# Patient Record
Sex: FEMALE | Race: BLACK OR AFRICAN AMERICAN | Employment: FULL TIME | URBAN - METROPOLITAN AREA
[De-identification: names, ages, dates, MRNs, and addresses within clinical notes are randomized per-mention and may not be internally consistent; named-entity substitution may affect disease eponyms.]

---

## 2017-01-13 ENCOUNTER — TELEPHONE (OUTPATIENT)
Dept: OBGYN CLINIC | Age: 32
End: 2017-01-13

## 2017-01-25 ENCOUNTER — OFFICE VISIT (OUTPATIENT)
Dept: OBGYN CLINIC | Age: 32
End: 2017-01-25

## 2017-01-25 VITALS
SYSTOLIC BLOOD PRESSURE: 111 MMHG | WEIGHT: 137 LBS | DIASTOLIC BLOOD PRESSURE: 72 MMHG | BODY MASS INDEX: 25.21 KG/M2 | HEIGHT: 62 IN | HEART RATE: 102 BPM

## 2017-01-25 DIAGNOSIS — Z01.419 WELL WOMAN EXAM WITH ROUTINE GYNECOLOGICAL EXAM: Primary | ICD-10-CM

## 2017-01-25 DIAGNOSIS — Z30.09 FAMILY PLANNING: ICD-10-CM

## 2017-01-25 RX ORDER — NORGESTIMATE AND ETHINYL ESTRADIOL 0.25-0.035
1 KIT ORAL DAILY
Qty: 3 PACKAGE | Refills: 4 | Status: SHIPPED | OUTPATIENT
Start: 2017-01-25

## 2017-01-25 NOTE — PROGRESS NOTES
Subjective:   27 y.o. female for annual routine Pap and checkup. No LMP recorded. Last pap smear:    Menstrual history: regular  Dysmenorrhea no  H/o STIs:  no  Social History: single partner, contraception - Ortho-Evra patches weekly. [unfilled]  OB History    Para Term  AB SAB TAB Ectopic Multiple Living   4 2   2     2      # Outcome Date GA Lbr Graham/2nd Weight Sex Delivery Anes PTL Lv   4 Para 06 40w0d  6 lb 7 oz (2.92 kg) F       3 Para 04 42w0d  6 lb 12 oz (3.062 kg) M       2 AB            1 AB                   Pertinent past medical hstory: no history of HTN, DVT, CAD, DM, liver disease, migraines or smoking. Patient Active Problem List   Diagnosis Code    Trichomonas A59.9    Left breast mass N63    Fibroadenoma D24.9     Patient Active Problem List    Diagnosis Date Noted    Fibroadenoma 2015    Left breast mass 2015    Trichomonas 2014     Current Outpatient Prescriptions   Medication Sig Dispense Refill    norgestimate-ethinyl estradiol (ORTHO-CYCLEN, SPRINTEC) 0.25-35 mg-mcg tab Take 1 Tab by mouth daily. Indications: PREGNANCY CONTRACEPTION 3 Package 4     Allergies   Allergen Reactions    Morphine Itching     Past Medical History   Diagnosis Date    Fibroadenoma 2015    Trichomonas 2014     Past Surgical History   Procedure Laterality Date    Hx cholecystectomy      Hx  section       History reviewed. No pertinent family history. Social History   Substance Use Topics    Smoking status: Never Smoker    Smokeless tobacco: Not on file    Alcohol use Yes      Comment: Social Drinker        ROS:  Feeling well. No dyspnea or chest pain on exertion. No abdominal pain, change in bowel habits, black or bloody stools. No urinary tract symptoms. GYN ROS: normal menses, no pelvic pain or discharge, no breast pain or new or enlarging lumps on self exam. No neurological complaints.     Objective:     Visit Vitals    /72    Pulse (!) 102    Ht 5' 2\" (1.575 m)    Wt 137 lb (62.1 kg)    BMI 25.06 kg/m2     The patient appears well, alert, oriented x 3, in no distress. ENT normal.  Neck supple. No adenopathy or thyromegaly. KYRIE. Lungs are clear, good air entry, no wheezes, rhonchi or rales. S1 and S2 normal, no murmurs, regular rate and rhythm. Abdomen soft without tenderness, guarding, mass or organomegaly. Extremities show no edema, normal peripheral pulses. Neurological is normal, no focal findings. BREAST EXAM: right breast normal without mass, skin or nipple changes or axillary nodes, left breast with 2 cm mobile mass, known fibroadenoma, no skin or nipple changes or axillary nodes    PELVIC EXAM: VULVA: normal appearing vulva with no masses, tenderness or lesions, VAGINA: normal appearing vagina with normal color and discharge, no lesions, CERVIX: normal appearing cervix with normal discharge or lesions, UTERUS: uterus is normal size, shape, consistency and nontender, ADNEXA: normal adnexa in size, nontender and no masses, PAP: Pap smear done today, DNA probe for chlamydia and GC obtained    Assessment/Plan:   well woman  pap smear  counseled on breast self exam, use and side effects of OCP's and family planning choices  return annually or prn    ICD-10-CM ICD-9-CM    1. Well woman exam with routine gynecological exam Z01.419 V72.31 PAP IG, HPV AND RFX HPV 53/72,69([id number])   2. Family planning Z30.09 V25.09 AMB POC URINE PREGNANCY TEST, VISUAL COLOR COMPARISON      norgestimate-ethinyl estradiol (ORTHO-CYCLEN, SPRINTEC) 0.25-35 mg-mcg tab   . I have verbalized the plan of care with patient and the patient expressed understanding.    All questions were answered

## 2017-01-25 NOTE — PATIENT INSTRUCTIONS
Combination Birth Control Pills: Care Instructions  Your Care Instructions    Combination birth control pills are used to prevent pregnancy. They give you a regular dose of the hormones estrogen and progestin. You take a hormone pill every day to prevent pregnancy. Birth control pills come in packs. The most common type has 3 weeks of hormone pills. Some packs have sugar pills (they do not contain any hormones) for the fourth week. During that fourth no-hormone week, you have your period. After the fourth week (28 days), you start a new pack. Some birth control pills are packaged in different ways. For example, some have hormone pills for the fourth week instead of sugar pills. Taking hormones for the entire month causes you to not have periods or to have fewer periods. Others are packaged so that you have a period every 3 months. Your doctor will tell you what type of pills you have. Follow-up care is a key part of your treatment and safety. Be sure to make and go to all appointments, and call your doctor if you are having problems. It's also a good idea to know your test results and keep a list of the medicines you take. How can you care for yourself at home? How do you take the pill? · Follow your doctor's instructions about when to start taking your pills. Use backup birth control, such as a condom, or don't have intercourse for 7 days after you start your pills. · Take your pills every day, at about the same time of day. To help yourself do this, try to take them when you do something else every day, such as brushing your teeth. What if you forget to take a pill? Always read the label for specific instructions, or call your doctor. Here are some basic guidelines:  · If you miss 1 hormone pill, take it as soon as you remember. Ask your doctor if you may need to use a backup birth control method, such as a condom, or not have intercourse.   · If you miss 2 or more hormone pills, take one as soon as you remember you forgot them. Then read the pill label or call your doctor about instructions on how to take your missed pills. Use a backup method of birth control or don't have intercourse for 7 days. Pregnancy is more likely if you miss more than 1 pill. · If you had intercourse, you can use emergency contraception, such as the morning-after pill (Plan B). You can use emergency contraception for up to 5 days after having had intercourse, but it works best if you take it right away. What else do you need to know? · The pill has side effects. ¨ You may have very light or skipped periods. ¨ You may have bleeding between periods (spotting). This usually decreases after 3 to 4 months. ¨ You may have mood changes, less interest in sex, or weight gain. · The pill may reduce acne, heavy bleeding and cramping, and symptoms of premenstrual syndrome. · Check with your doctor before you use any other medicines, including over-the-counter medicines, vitamins, herbal products, and supplements. Birth control hormones may not work as well to prevent pregnancy when combined with other medicines. · The pill doesn't protect against sexually transmitted infection (STIs), such as herpes or HIV/AIDS. If you're not sure whether your sex partner might have an STI, use a condom to protect against disease. When should you call for help? Call your doctor now or seek immediate medical care if:  · You have severe belly pain. · You have signs of a blood clot, such as:  ¨ Pain in your calf, back of the knee, thigh, or groin. ¨ Redness and swelling in your leg or groin. · You have blurred vision or other problems seeing. · You have a severe headache. · You have severe trouble breathing. Watch closely for changes in your health, and be sure to contact your doctor if:  · You think you might be pregnant. · You think you may be depressed. · You think you may have been exposed to or have a sexually transmitted infection.   Where can you learn more? Go to http://terra-tam.info/. Enter M241 in the search box to learn more about \"Combination Birth Control Pills: Care Instructions. \"  Current as of: May 30, 2016  Content Version: 11.1  © 2250-4043 [employer]. Care instructions adapted under license by [employer] (which disclaims liability or warranty for this information). If you have questions about a medical condition or this instruction, always ask your healthcare professional. [address] any warranty or liability for your use of this information.

## 2017-01-25 NOTE — MR AVS SNAPSHOT
Visit Information Date & Time Provider Department Dept. Phone Encounter #  
 1/25/2017  3:00 PM [phone] Follow-up Instructions Return in about 1 year (around 1/25/2018). Upcoming Health Maintenance Date Due INFLUENZA AGE 9 TO ADULT 8/1/2016 PAP AKA CERVICAL CYTOLOGY 1/24/2017 Allergies as of 1/25/2017  Review Complete On: 2/4/2016 By: Kim Macias DO Severity Noted Reaction Type Reactions Morphine  09/18/2015    Itching Current Immunizations  Never Reviewed No immunizations on file. Not reviewed this visit You Were Diagnosed With   
  
 Codes Comments Well woman exam with routine gynecological exam    -  Primary ICD-10-CM: K84.437 ICD-9-CM: V72.31 Family planning     ICD-10-CM: Z30.09 
ICD-9-CM: V25.09 Vitals BP Pulse Height(growth percentile) Weight(growth percentile) BMI OB Status 111/72 (!) 102 5' 2\" (1.575 m) 137 lb (62.1 kg) 25.06 kg/m2 Having regular periods Smoking Status Never Smoker BMI and BSA Data Body Mass Index Body Surface Area 25.06 kg/m 2 1.65 m 2 Preferred Pharmacy Pharmacy Name Phone [facility] [facility] [phone] Your Updated Medication List  
  
   
This list is accurate as of: 1/25/17  3:27 PM.  Always use your most recent med list.  
  
  
  
  
 norgestimate-ethinyl estradiol 0.25-35 mg-mcg Tab Commonly known as:  Mac Pounds Take 1 Tab by mouth daily. Indications: PREGNANCY CONTRACEPTION Prescriptions Sent to Pharmacy Refills  
 norgestimate-ethinyl estradiol (ORTHO-CYCLEN, SPRINTEC) 0.25-35 mg-mcg tab 4 Sig: Take 1 Tab by mouth daily. Indications: PREGNANCY CONTRACEPTION Class: Normal  
 Pharmacy: [facility]  [facility] Ph #: [phone]  Route: Oral  
 We Performed the Following AMB POC URINE PREGNANCY TEST, VISUAL COLOR COMPARISON [60575 CPT(R)] Follow-up Instructions Return in about 1 year (around 1/25/2018). To-Do List   
 01/25/2017 Pathology:  PAP IG, HPV AND RFX HPV 76/25,74([id number]) Patient Instructions Combination Birth Control Pills: Care Instructions Your Care Instructions Combination birth control pills are used to prevent pregnancy. They give you a regular dose of the hormones estrogen and progestin. You take a hormone pill every day to prevent pregnancy. Birth control pills come in packs. The most common type has 3 weeks of hormone pills. Some packs have sugar pills (they do not contain any hormones) for the fourth week. During that fourth no-hormone week, you have your period. After the fourth week (28 days), you start a new pack. Some birth control pills are packaged in different ways. For example, some have hormone pills for the fourth week instead of sugar pills. Taking hormones for the entire month causes you to not have periods or to have fewer periods. Others are packaged so that you have a period every 3 months. Your doctor will tell you what type of pills you have. Follow-up care is a key part of your treatment and safety. Be sure to make and go to all appointments, and call your doctor if you are having problems. It's also a good idea to know your test results and keep a list of the medicines you take. How can you care for yourself at home? How do you take the pill? · Follow your doctor's instructions about when to start taking your pills. Use backup birth control, such as a condom, or don't have intercourse for 7 days after you start your pills. · Take your pills every day, at about the same time of day. To help yourself do this, try to take them when you do something else every day, such as brushing your teeth. What if you forget to take a pill? Always read the label for specific instructions, or call your doctor. Here are some basic guidelines: · If you miss 1 hormone pill, take it as soon as you remember. Ask your doctor if you may need to use a backup birth control method, such as a condom, or not have intercourse. · If you miss 2 or more hormone pills, take one as soon as you remember you forgot them. Then read the pill label or call your doctor about instructions on how to take your missed pills. Use a backup method of birth control or don't have intercourse for 7 days. Pregnancy is more likely if you miss more than 1 pill. · If you had intercourse, you can use emergency contraception, such as the morning-after pill (Plan B). You can use emergency contraception for up to 5 days after having had intercourse, but it works best if you take it right away. What else do you need to know? · The pill has side effects. ¨ You may have very light or skipped periods. ¨ You may have bleeding between periods (spotting). This usually decreases after 3 to 4 months. ¨ You may have mood changes, less interest in sex, or weight gain. · The pill may reduce acne, heavy bleeding and cramping, and symptoms of premenstrual syndrome. · Check with your doctor before you use any other medicines, including over-the-counter medicines, vitamins, herbal products, and supplements. Birth control hormones may not work as well to prevent pregnancy when combined with other medicines. · The pill doesn't protect against sexually transmitted infection (STIs), such as herpes or HIV/AIDS. If you're not sure whether your sex partner might have an STI, use a condom to protect against disease. When should you call for help? Call your doctor now or seek immediate medical care if: 
· You have severe belly pain. · You have signs of a blood clot, such as: 
¨ Pain in your calf, back of the knee, thigh, or groin. ¨ Redness and swelling in your leg or groin. · You have blurred vision or other problems seeing. · You have a severe headache. · You have severe trouble breathing. Watch closely for changes in your health, and be sure to contact your doctor if: 
· You think you might be pregnant. · You think you may be depressed. · You think you may have been exposed to or have a sexually transmitted infection. Where can you learn more? Go to http://terra-tam.info/. Enter Q388 in the search box to learn more about \"Combination Birth Control Pills: Care Instructions. \" Current as of: May 30, 2016 Content Version: 11.1 © 0236-2499 [employer]. Care instructions adapted under license by [employer] (which disclaims liability or warranty for this information). If you have questions about a medical condition or this instruction, always ask your healthcare professional. [address] any warranty or liability for your use of this information. Introducing [facility]! Dahlia Huitron introduces [employer] patient portal. Now you can access parts of your medical record, email your doctor's office, and request medication refills online. 1. In your internet browser, go to [email or web address] 2. Click on the First Time User? Click Here link in the Sign In box. You will see the New Member Sign Up page. 3. Enter your [employer] Access Code exactly as it appears below. You will not need to use this code after youve completed the sign-up process. If you do not sign up before the expiration date, you must request a new code. · [employer] Access Code: [email or web address] Expires: 4/25/2017  3:24 PM 
 
4. Enter the last four digits of your Social Security Number (xxxx) and Date of Birth (mm/dd/yyyy) as indicated and click Submit. You will be taken to the next sign-up page. 5. Create a [employer] ID.  This will be your [employer] login ID and cannot be changed, so think of one that is secure and easy to remember. 6. Create a [employer] password. You can change your password at any time. 7. Enter your Password Reset Question and Answer. This can be used at a later time if you forget your password. 8. Enter your e-mail address. You will receive e-mail notification when new information is available in [address] 9. Click Sign Up. You can now view and download portions of your medical record. 10. Click the Download Summary menu link to download a portable copy of your medical information. If you have questions, please visit the Frequently Asked Questions section of the [employer] website. Remember, [employer] is NOT to be used for urgent needs. For medical emergencies, dial 911. Now available from your iPhone and Android! Please provide this summary of care documentation to your next provider. Your primary care clinician is listed as PROVIDER UNKNOWN. If you have any questions after today's visit, please call [phone].

## 2017-01-25 NOTE — LETTER
2/1/2017 11:40 AM 
 
Ms. Tiff Bautista 
[address] [facility] [address] [id number] Dear Tiff Bautista I have reviewed your results and have found the results listed below to be within normal ranges. Pap Smear My recommendations are as follows: 
None. Keep up the good work! Please schedule your next Pap Smear in three years . Please call if you have any questions [phone] . Sincerely, Allie Garcia, DO

## 2017-01-30 LAB
HPV HIGH RISK, 507303: NEGATIVE
PAP IMAGE GUIDED, 8900296: NORMAL